# Patient Record
Sex: MALE | Race: WHITE
[De-identification: names, ages, dates, MRNs, and addresses within clinical notes are randomized per-mention and may not be internally consistent; named-entity substitution may affect disease eponyms.]

---

## 2017-06-06 ENCOUNTER — HOSPITAL ENCOUNTER (EMERGENCY)
Dept: HOSPITAL 8 - ED | Age: 72
Discharge: HOME | End: 2017-06-06
Payer: COMMERCIAL

## 2017-06-06 VITALS — BODY MASS INDEX: 20.48 KG/M2 | WEIGHT: 135.14 LBS | HEIGHT: 68 IN

## 2017-06-06 VITALS — DIASTOLIC BLOOD PRESSURE: 58 MMHG | SYSTOLIC BLOOD PRESSURE: 122 MMHG
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
## 2017-06-06 DIAGNOSIS — S39.013A: Primary | ICD-10-CM

## 2017-06-06 DIAGNOSIS — I10: ICD-10-CM
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
## 2017-06-06 DIAGNOSIS — Z87.891: ICD-10-CM
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
## 2017-06-06 DIAGNOSIS — M13.851: ICD-10-CM

## 2017-06-06 PROCEDURE — 76870 US EXAM SCROTUM: CPT

## 2017-06-06 PROCEDURE — 81003 URINALYSIS AUTO W/O SCOPE: CPT

## 2017-06-06 PROCEDURE — 99285 EMERGENCY DEPT VISIT HI MDM: CPT

## 2017-06-06 PROCEDURE — 76857 US EXAM PELVIC LIMITED: CPT

## 2018-07-20 ENCOUNTER — HOSPITAL ENCOUNTER (OUTPATIENT)
Dept: HOSPITAL 8 - RAD | Age: 73
Discharge: HOME | End: 2018-07-20
Attending: OTOLARYNGOLOGY
Payer: COMMERCIAL

## 2018-07-20 DIAGNOSIS — K13.79: ICD-10-CM

## 2018-07-20 DIAGNOSIS — J43.2: ICD-10-CM

## 2018-07-20 DIAGNOSIS — D10.39: ICD-10-CM

## 2018-07-20 DIAGNOSIS — C76.0: Primary | ICD-10-CM

## 2018-07-20 LAB — CREAT SERPL-MCNC: 0.88 MG/DL (ref 0.7–1.3)

## 2018-07-20 PROCEDURE — 36415 COLL VENOUS BLD VENIPUNCTURE: CPT

## 2018-07-20 PROCEDURE — 71250 CT THORAX DX C-: CPT

## 2018-07-20 PROCEDURE — 70487 CT MAXILLOFACIAL W/DYE: CPT

## 2018-07-20 PROCEDURE — 82565 ASSAY OF CREATININE: CPT

## 2018-08-08 ENCOUNTER — HOSPITAL ENCOUNTER (OUTPATIENT)
Dept: HOSPITAL 8 - STAR | Age: 73
Discharge: HOME | End: 2018-08-08
Attending: OTOLARYNGOLOGY
Payer: COMMERCIAL

## 2018-08-08 VITALS — BODY MASS INDEX: 22.02 KG/M2 | WEIGHT: 145.31 LBS | HEIGHT: 68 IN

## 2018-08-08 DIAGNOSIS — D10.30: ICD-10-CM

## 2018-08-08 DIAGNOSIS — Z01.818: Primary | ICD-10-CM

## 2018-08-08 PROCEDURE — 93005 ELECTROCARDIOGRAM TRACING: CPT

## 2021-01-13 DIAGNOSIS — Z23 NEED FOR VACCINATION: ICD-10-CM

## 2022-03-01 ENCOUNTER — OFFICE VISIT (OUTPATIENT)
Dept: MEDICAL GROUP | Facility: CLINIC | Age: 77
End: 2022-03-01
Payer: COMMERCIAL

## 2022-03-01 VITALS
DIASTOLIC BLOOD PRESSURE: 84 MMHG | HEIGHT: 68 IN | HEART RATE: 96 BPM | BODY MASS INDEX: 19.93 KG/M2 | RESPIRATION RATE: 16 BRPM | SYSTOLIC BLOOD PRESSURE: 148 MMHG | WEIGHT: 131.5 LBS | OXYGEN SATURATION: 96 %

## 2022-03-01 DIAGNOSIS — E78.5 DYSLIPIDEMIA: ICD-10-CM

## 2022-03-01 DIAGNOSIS — E78.5 HYPERLIPIDEMIA, UNSPECIFIED HYPERLIPIDEMIA TYPE: ICD-10-CM

## 2022-03-01 DIAGNOSIS — J44.9 CHRONIC OBSTRUCTIVE PULMONARY DISEASE, UNSPECIFIED COPD TYPE (HCC): ICD-10-CM

## 2022-03-01 DIAGNOSIS — I10 PRIMARY HYPERTENSION: ICD-10-CM

## 2022-03-01 PROCEDURE — 99214 OFFICE O/P EST MOD 30 MIN: CPT | Mod: GC | Performed by: STUDENT IN AN ORGANIZED HEALTH CARE EDUCATION/TRAINING PROGRAM

## 2022-03-01 RX ORDER — TIOTROPIUM BROMIDE INHALATION SPRAY 3.12 UG/1
SPRAY, METERED RESPIRATORY (INHALATION)
COMMUNITY
Start: 2022-02-02

## 2022-03-01 RX ORDER — AMLODIPINE BESYLATE 10 MG/1
10 TABLET ORAL DAILY
Qty: 100 TABLET | Refills: 3 | Status: SHIPPED | OUTPATIENT
Start: 2022-03-01 | End: 2022-03-07 | Stop reason: SDUPTHER

## 2022-03-01 RX ORDER — TIOTROPIUM BROMIDE INHALATION SPRAY 3.12 UG/1
2 SPRAY, METERED RESPIRATORY (INHALATION) DAILY
COMMUNITY
Start: 2021-12-20 | End: 2023-03-27

## 2022-03-01 RX ORDER — BUDESONIDE AND FORMOTEROL FUMARATE DIHYDRATE 160; 4.5 UG/1; UG/1
2 AEROSOL RESPIRATORY (INHALATION)
COMMUNITY
Start: 2021-12-20

## 2022-03-01 RX ORDER — BUDESONIDE AND FORMOTEROL FUMARATE DIHYDRATE 160; 4.5 UG/1; UG/1
AEROSOL RESPIRATORY (INHALATION)
COMMUNITY
Start: 2022-02-02 | End: 2022-03-01

## 2022-03-01 RX ORDER — ATORVASTATIN CALCIUM 40 MG/1
40 TABLET, FILM COATED ORAL NIGHTLY
Qty: 100 TABLET | Refills: 3 | Status: SHIPPED | OUTPATIENT
Start: 2022-03-01 | End: 2022-03-07 | Stop reason: SDUPTHER

## 2022-03-01 NOTE — PROGRESS NOTES
"[facility]    Chief Complaint   Patient presents with   • Follow-Up     Med review; Pulmonary appt on 3/15,  96% at 2% O2       HISTORY OF PRESENT ILLNESS: Patient is a 76 y.o. male established patient who presents today to discuss the medical issues below:    Problem   Htn (Hypertension)    Patient with hypertension who is on amlodipine 10 mg p.o. daily.  He denies any side effects.  Denies any exertional chest pain, shortness of breath, swelling in extremities, or other complaints.     Copd (Chronic Obstructive Pulmonary Disease) (Hcc)    Patient reports compliance with his inhalers.  Denies any side effects.  Denies any cough, increased sputum production, or shortness of breath.     Hyperlipidemia    Compliant with atorvastatin.  Denies any side effects.          Patient Active Problem List    Diagnosis Date Noted   • HTN (hypertension) 03/01/2022   • COPD (chronic obstructive pulmonary disease) (HCC) 03/01/2022   • Hyperlipidemia 03/01/2022       Allergies:Patient has no allergy information on record.    Current Outpatient Medications   Medication Sig Dispense Refill   • budesonide-formoterol (SYMBICORT) 160-4.5 MCG/ACT Aerosol Inhale 2 Puffs.     • SPIRIVA RESPIMAT 2.5 MCG/ACT Aero Soln      • atorvastatin (LIPITOR) 40 MG Tab Take 1 Tablet by mouth every evening. 100 Tablet 3   • amLODIPine (NORVASC) 10 MG Tab Take 1 Tablet by mouth every day. 100 Tablet 3   • tiotropium (SPIRIVA RESPIMAT) 2.5 mcg/Act Aero Soln Inhale 2 Puffs every day.       No current facility-administered medications for this visit.         No past medical history on file.         No family status information on file.   No family history on file.    ROS:  Negative except as stated above.      Exam:    /84 (BP Location: Left arm, Patient Position: Sitting, BP Cuff Size: Small adult)   Pulse 96   Resp 16   Ht 1.727 m (5' 8\")   Wt 59.6 kg (131 lb 8 oz)   SpO2 96%  Body mass index is 19.99 kg/m².  General:  Well nourished, " well developed male in NAD.  HENT: Normocephalic, bilateral TMs are intact, nasal and oral mucosa with no lesions,   Neck: Supple without bruit. Thyroid is not enlarged, no nodules palpated.  Pulmonary: Clear to ausculation.  Coarse breath sounds.  Normal effort. No rales, rhonchi, or wheezing.  Cardiovascular: Regular rate and rhythm without murmur.   Abdomen: Normal bowel sounds, soft and nontender, no palpable liver, spleen, or masses.  Extremities: No LE edema noted. 5/5 strength in all extremities  Neuro: Grossly nonfocal.  Skin: No lesions, erythema, or other abnormalities noted.  Psych: Alert and oriented to person, place, and time. Appropriate mood and conversation.        Assessment/Plan:    HTN (hypertension)  Chronic, stable    76-year-old male with multiple comorbidities and end-stage COPD presenting for follow-up and found to be hypertensive 148/84.  Repeat blood pressure 150/90.  Discussed goal in this patient 150/90.  Encourage patient to obtain blood pressure cuff and check readings at home at least 4 times per week.  Follow-up in 1 month with blood pressure readings.    -Continue amlodipine 10 mg p.o. daily  -Check CBC and CMP  -Return to clinic in 1 month with BP log    COPD (chronic obstructive pulmonary disease) (HCC)  Chronic, stable    -Continue Spiriva 2 puffs daily, Symbicort, and as needed albuterol    Hyperlipidemia  Chronic, stable    -Repeat lipid panel  -Continue atorvastatin 40 mg p.o. daily

## 2022-03-01 NOTE — ASSESSMENT & PLAN NOTE
Chronic, stable    76-year-old male with multiple comorbidities and end-stage COPD presenting for follow-up and found to be hypertensive 148/84.  Repeat blood pressure 150/90.  Discussed goal in this patient 150/90.  Encourage patient to obtain blood pressure cuff and check readings at home at least 4 times per week.  Follow-up in 1 month with blood pressure readings.    -Continue amlodipine 10 mg p.o. daily  -Check CBC and CMP  -Return to clinic in 1 month with BP log

## 2022-03-07 RX ORDER — AMLODIPINE BESYLATE 10 MG/1
10 TABLET ORAL DAILY
Qty: 100 TABLET | Refills: 3 | Status: SHIPPED | OUTPATIENT
Start: 2022-03-07 | End: 2023-09-11 | Stop reason: SDUPTHER

## 2022-03-07 RX ORDER — ATORVASTATIN CALCIUM 40 MG/1
40 TABLET, FILM COATED ORAL NIGHTLY
Qty: 100 TABLET | Refills: 3 | Status: SHIPPED | OUTPATIENT
Start: 2022-03-07 | End: 2023-09-11 | Stop reason: SDUPTHER

## 2022-03-22 ENCOUNTER — HOSPITAL ENCOUNTER (OUTPATIENT)
Dept: LAB | Facility: MEDICAL CENTER | Age: 77
End: 2022-03-22
Attending: STUDENT IN AN ORGANIZED HEALTH CARE EDUCATION/TRAINING PROGRAM
Payer: COMMERCIAL

## 2022-03-22 DIAGNOSIS — J44.9 CHRONIC OBSTRUCTIVE PULMONARY DISEASE, UNSPECIFIED COPD TYPE (HCC): ICD-10-CM

## 2022-03-22 DIAGNOSIS — E78.5 DYSLIPIDEMIA: ICD-10-CM

## 2022-03-22 DIAGNOSIS — I10 PRIMARY HYPERTENSION: ICD-10-CM

## 2022-03-22 LAB
ALBUMIN SERPL BCP-MCNC: 4.8 G/DL (ref 3.2–4.9)
ALBUMIN/GLOB SERPL: 2 G/DL
ALP SERPL-CCNC: 58 U/L (ref 30–99)
ALT SERPL-CCNC: 19 U/L (ref 2–50)
ANION GAP SERPL CALC-SCNC: 11 MMOL/L (ref 7–16)
AST SERPL-CCNC: 24 U/L (ref 12–45)
BASOPHILS # BLD AUTO: 0.6 % (ref 0–1.8)
BASOPHILS # BLD: 0.03 K/UL (ref 0–0.12)
BILIRUB SERPL-MCNC: 0.4 MG/DL (ref 0.1–1.5)
BUN SERPL-MCNC: 10 MG/DL (ref 8–22)
CALCIUM SERPL-MCNC: 10 MG/DL (ref 8.5–10.5)
CHLORIDE SERPL-SCNC: 100 MMOL/L (ref 96–112)
CHOLEST SERPL-MCNC: 208 MG/DL (ref 100–199)
CO2 SERPL-SCNC: 28 MMOL/L (ref 20–33)
CREAT SERPL-MCNC: 0.82 MG/DL (ref 0.5–1.4)
EOSINOPHIL # BLD AUTO: 0.19 K/UL (ref 0–0.51)
EOSINOPHIL NFR BLD: 3.5 % (ref 0–6.9)
ERYTHROCYTE [DISTWIDTH] IN BLOOD BY AUTOMATED COUNT: 48.9 FL (ref 35.9–50)
FASTING STATUS PATIENT QL REPORTED: NORMAL
GFR SERPLBLD CREATININE-BSD FMLA CKD-EPI: 91 ML/MIN/1.73 M 2
GLOBULIN SER CALC-MCNC: 2.4 G/DL (ref 1.9–3.5)
GLUCOSE SERPL-MCNC: 89 MG/DL (ref 65–99)
HCT VFR BLD AUTO: 42.1 % (ref 42–52)
HDLC SERPL-MCNC: 82 MG/DL
HGB BLD-MCNC: 13.4 G/DL (ref 14–18)
IMM GRANULOCYTES # BLD AUTO: 0.01 K/UL (ref 0–0.11)
IMM GRANULOCYTES NFR BLD AUTO: 0.2 % (ref 0–0.9)
LDLC SERPL CALC-MCNC: 110 MG/DL
LYMPHOCYTES # BLD AUTO: 1.6 K/UL (ref 1–4.8)
LYMPHOCYTES NFR BLD: 29.8 % (ref 22–41)
MCH RBC QN AUTO: 30.2 PG (ref 27–33)
MCHC RBC AUTO-ENTMCNC: 31.8 G/DL (ref 33.7–35.3)
MCV RBC AUTO: 95 FL (ref 81.4–97.8)
MONOCYTES # BLD AUTO: 0.52 K/UL (ref 0–0.85)
MONOCYTES NFR BLD AUTO: 9.7 % (ref 0–13.4)
NEUTROPHILS # BLD AUTO: 3.02 K/UL (ref 1.82–7.42)
NEUTROPHILS NFR BLD: 56.2 % (ref 44–72)
NRBC # BLD AUTO: 0 K/UL
NRBC BLD-RTO: 0 /100 WBC
PLATELET # BLD AUTO: 295 K/UL (ref 164–446)
PMV BLD AUTO: 10.6 FL (ref 9–12.9)
POTASSIUM SERPL-SCNC: 4.2 MMOL/L (ref 3.6–5.5)
PROT SERPL-MCNC: 7.2 G/DL (ref 6–8.2)
RBC # BLD AUTO: 4.43 M/UL (ref 4.7–6.1)
SODIUM SERPL-SCNC: 139 MMOL/L (ref 135–145)
TRIGL SERPL-MCNC: 80 MG/DL (ref 0–149)
WBC # BLD AUTO: 5.4 K/UL (ref 4.8–10.8)

## 2022-03-22 PROCEDURE — 80053 COMPREHEN METABOLIC PANEL: CPT

## 2022-03-22 PROCEDURE — 80061 LIPID PANEL: CPT

## 2022-03-22 PROCEDURE — 36415 COLL VENOUS BLD VENIPUNCTURE: CPT

## 2022-03-22 PROCEDURE — 85025 COMPLETE CBC W/AUTO DIFF WBC: CPT

## 2022-03-28 ENCOUNTER — OFFICE VISIT (OUTPATIENT)
Dept: MEDICAL GROUP | Facility: CLINIC | Age: 77
End: 2022-03-28
Payer: COMMERCIAL

## 2022-03-28 VITALS
BODY MASS INDEX: 20.16 KG/M2 | WEIGHT: 133 LBS | SYSTOLIC BLOOD PRESSURE: 152 MMHG | OXYGEN SATURATION: 89 % | HEART RATE: 100 BPM | DIASTOLIC BLOOD PRESSURE: 83 MMHG | RESPIRATION RATE: 17 BRPM | HEIGHT: 68 IN

## 2022-03-28 DIAGNOSIS — I10 PRIMARY HYPERTENSION: ICD-10-CM

## 2022-03-28 PROCEDURE — 99213 OFFICE O/P EST LOW 20 MIN: CPT | Mod: GE | Performed by: STUDENT IN AN ORGANIZED HEALTH CARE EDUCATION/TRAINING PROGRAM

## 2022-03-28 ASSESSMENT — PATIENT HEALTH QUESTIONNAIRE - PHQ9: CLINICAL INTERPRETATION OF PHQ2 SCORE: 0

## 2022-03-28 ASSESSMENT — FIBROSIS 4 INDEX: FIB4 SCORE: 1.42

## 2022-04-02 NOTE — PROGRESS NOTES
"[facility]    Chief Complaint   Patient presents with   • Follow-Up       HISTORY OF PRESENT ILLNESS: Patient is a 76 y.o. male established patient who presents today to discuss the medical issues below:    Problem   Htn (Hypertension)    Patient with hypertension who is on amlodipine 10 mg p.o. daily.  He denies any side effects.  Denies any exertional chest pain, shortness of breath, swelling in extremities, or other complaints. Checking BP at home and ranging -s-140's/70's-90s.                Patient Active Problem List    Diagnosis Date Noted   • HTN (hypertension) 03/01/2022   • COPD (chronic obstructive pulmonary disease) (HCC) 03/01/2022   • Hyperlipidemia 03/01/2022       Allergies:Patient has no allergy information on record.    Current Outpatient Medications   Medication Sig Dispense Refill   • amLODIPine (NORVASC) 10 MG Tab Take 1 Tablet by mouth every day. 100 Tablet 3   • atorvastatin (LIPITOR) 40 MG Tab Take 1 Tablet by mouth every evening. 100 Tablet 3   • budesonide-formoterol (SYMBICORT) 160-4.5 MCG/ACT Aerosol Inhale 2 Puffs.     • tiotropium (SPIRIVA RESPIMAT) 2.5 mcg/Act Aero Soln Inhale 2 Puffs every day.     • SPIRIVA RESPIMAT 2.5 MCG/ACT Aero Soln        No current facility-administered medications for this visit.         No past medical history on file.    Social History     Tobacco Use   • Smoking status: Former Smoker   • Smokeless tobacco: Never Used   Substance Use Topics   • Alcohol use: Not Currently   • Drug use: Not Currently       No family status information on file.   No family history on file.    ROS:  Negative except as stated above.      Exam:    /83 (BP Location: Right arm, Patient Position: Sitting, BP Cuff Size: Adult)   Pulse 100   Resp 17   Ht 1.727 m (5' 8\")   Wt 60.3 kg (133 lb)   SpO2 89%  Body mass index is 20.22 kg/m².  General: Well-appearing and in no acute distress  HEENT: MMM, EOMI  Lungs: No respiratory distress or audible " wheezing  Heart: Pulse present.  Abdomen: Nondistended.  Skin: No rashes or lesions visible  EXT: Warm and well-perfused  Neuro: Nonfocal    Assessment/Plan:    HTN (hypertension)  Chronic, stable    - Continue amlodipine 10 mg po daily  - RTC in 1 year

## 2023-03-27 ENCOUNTER — OFFICE VISIT (OUTPATIENT)
Dept: MEDICAL GROUP | Facility: CLINIC | Age: 78
End: 2023-03-27
Payer: COMMERCIAL

## 2023-03-27 VITALS
HEART RATE: 106 BPM | WEIGHT: 131 LBS | TEMPERATURE: 98 F | HEIGHT: 68 IN | BODY MASS INDEX: 19.85 KG/M2 | SYSTOLIC BLOOD PRESSURE: 118 MMHG | DIASTOLIC BLOOD PRESSURE: 70 MMHG | OXYGEN SATURATION: 91 %

## 2023-03-27 DIAGNOSIS — J44.9 CHRONIC OBSTRUCTIVE PULMONARY DISEASE, UNSPECIFIED COPD TYPE (HCC): ICD-10-CM

## 2023-03-27 DIAGNOSIS — E78.5 HYPERLIPIDEMIA, UNSPECIFIED HYPERLIPIDEMIA TYPE: ICD-10-CM

## 2023-03-27 DIAGNOSIS — I10 PRIMARY HYPERTENSION: ICD-10-CM

## 2023-03-27 PROCEDURE — 99213 OFFICE O/P EST LOW 20 MIN: CPT | Mod: GE | Performed by: HEALTH CARE PROVIDER

## 2023-03-27 ASSESSMENT — PATIENT HEALTH QUESTIONNAIRE - PHQ9: CLINICAL INTERPRETATION OF PHQ2 SCORE: 0

## 2023-03-27 ASSESSMENT — FIBROSIS 4 INDEX: FIB4 SCORE: 1.44

## 2023-03-27 NOTE — ASSESSMENT & PLAN NOTE
Stable, continuing to follow with Pulmonary Clinic. Continue with Spiriva and Symbicort with PRN albuterol. Stable on 2L supplemental oxygen. No changes needed at this time.

## 2023-03-27 NOTE — ASSESSMENT & PLAN NOTE
Stable, well controlled. /70 on recheck today with no symptoms of hypertensive emergency or hypotension. Will continue amlodipine 10 mg daily at this time.

## 2023-03-27 NOTE — PROGRESS NOTES
"  [facility]    Subjective:     CC: Follow up    HPI:   Brent HERNÁNDEZ is a 77 y.o. male with past medical history of COPD, HTN, HLD presenting today for follow up. No acute complaints. Follows with pulmonary clinic and has been seen in past 1-2 weeks. Stable on Spiriva and Symbicort. Remains on 2L supplemental oxygen via [place] for 10+ years. HTN has been stable for several years on amlodipine 10 mg daily.      Current Outpatient Medications Ordered in Epic   Medication Sig Dispense Refill    amLODIPine (NORVASC) 10 MG Tab Take 1 Tablet by mouth every day. 100 Tablet 3    atorvastatin (LIPITOR) 40 MG Tab Take 1 Tablet by mouth every evening. 100 Tablet 3    SPIRIVA RESPIMAT 2.5 MCG/ACT Aero Soln       budesonide-formoterol (SYMBICORT) 160-4.5 MCG/ACT Aerosol Inhale 2 Puffs. (Patient not taking: Reported on 3/27/2023)       No current [facility]-ordered facility-administered medications on file.         ROS:  Gen: no fevers/chills, no changes in weight  Eyes: no changes in vision  ENT: no sore throat, no hearing loss, no bloody nose  Pulm: no sob, no cough  CV: no chest pain, no palpitations  GI: no nausea/vomiting, no diarrhea  : no dysuria  MSk: no myalgias  Skin: no rash  Neuro: no headaches, no numbness/tingling  Heme/Lymph: no easy bruising      Objective:     Exam:  /70   Pulse (!) 106   Temp 36.7 °C (98 °F)   Ht 1.727 m (5' 8\")   Wt 59.4 kg (131 lb)   SpO2 91% Comment: 2 litters  BMI 19.92 kg/m²  Body mass index is 19.92 kg/m².    Gen:  Alert and oriented, No apparent distress, nasal cannula in place.  HEENT: Neck is supple without lymphadenopathy, EOMI, no pharyngeal erythema or exudate  Lungs:  Normal effort, CTA bilaterally, no wheezes, rhonchi, or rales  CV:  Regular rate and rhythm. No murmurs, rubs, or gallops.  Abd:      Soft, non-tender, non-distended  Ext:  No clubbing, cyanosis, edema.      Labs: None      Assessment & Plan:     77 y.o. male with the following -     Problem List Items " Addressed This Visit       HTN (hypertension)     Stable, well controlled. /70 on recheck today with no symptoms of hypertensive emergency or hypotension. Will continue amlodipine 10 mg daily at this time.         COPD (chronic obstructive pulmonary disease) (HCC)     Stable, continuing to follow with Pulmonary Clinic. Continue with Spiriva and Symbicort with PRN albuterol. Stable on 2L supplemental oxygen. No changes needed at this time.         Relevant Orders    Comp Metabolic Panel    CBC WITH DIFFERENTIAL    Lipid Profile    Hyperlipidemia     Stable on Lipitor 40 mg daily. Will recheck lipid panel at this time.              Follow up in 6 months or earlier as needed          Tobi Nevarez M.D.  [facility]  PGY-2

## 2023-07-24 ENCOUNTER — HOSPITAL ENCOUNTER (OUTPATIENT)
Dept: LAB | Facility: MEDICAL CENTER | Age: 78
End: 2023-07-24
Attending: HEALTH CARE PROVIDER
Payer: COMMERCIAL

## 2023-07-24 DIAGNOSIS — J44.9 CHRONIC OBSTRUCTIVE PULMONARY DISEASE, UNSPECIFIED COPD TYPE (HCC): ICD-10-CM

## 2023-07-24 LAB
ALBUMIN SERPL BCP-MCNC: 4.4 G/DL (ref 3.2–4.9)
ALBUMIN/GLOB SERPL: 1.5 G/DL
ALP SERPL-CCNC: 71 U/L (ref 30–99)
ALT SERPL-CCNC: 26 U/L (ref 2–50)
ANION GAP SERPL CALC-SCNC: 11 MMOL/L (ref 7–16)
AST SERPL-CCNC: 22 U/L (ref 12–45)
BASOPHILS # BLD AUTO: 0.7 % (ref 0–1.8)
BASOPHILS # BLD: 0.04 K/UL (ref 0–0.12)
BILIRUB SERPL-MCNC: 0.4 MG/DL (ref 0.1–1.5)
BUN SERPL-MCNC: 11 MG/DL (ref 8–22)
CALCIUM ALBUM COR SERPL-MCNC: 10 MG/DL (ref 8.5–10.5)
CALCIUM SERPL-MCNC: 10.3 MG/DL (ref 8.5–10.5)
CHLORIDE SERPL-SCNC: 99 MMOL/L (ref 96–112)
CHOLEST SERPL-MCNC: 212 MG/DL (ref 100–199)
CO2 SERPL-SCNC: 28 MMOL/L (ref 20–33)
CREAT SERPL-MCNC: 0.72 MG/DL (ref 0.5–1.4)
EOSINOPHIL # BLD AUTO: 0.14 K/UL (ref 0–0.51)
EOSINOPHIL NFR BLD: 2.5 % (ref 0–6.9)
ERYTHROCYTE [DISTWIDTH] IN BLOOD BY AUTOMATED COUNT: 50.4 FL (ref 35.9–50)
FASTING STATUS PATIENT QL REPORTED: NORMAL
GFR SERPLBLD CREATININE-BSD FMLA CKD-EPI: 94 ML/MIN/1.73 M 2
GLOBULIN SER CALC-MCNC: 2.9 G/DL (ref 1.9–3.5)
GLUCOSE SERPL-MCNC: 88 MG/DL (ref 65–99)
HCT VFR BLD AUTO: 43.7 % (ref 42–52)
HDLC SERPL-MCNC: 68 MG/DL
HGB BLD-MCNC: 14 G/DL (ref 14–18)
IMM GRANULOCYTES # BLD AUTO: 0.01 K/UL (ref 0–0.11)
IMM GRANULOCYTES NFR BLD AUTO: 0.2 % (ref 0–0.9)
LDLC SERPL CALC-MCNC: 127 MG/DL
LYMPHOCYTES # BLD AUTO: 1.33 K/UL (ref 1–4.8)
LYMPHOCYTES NFR BLD: 24 % (ref 22–41)
MCH RBC QN AUTO: 30.6 PG (ref 27–33)
MCHC RBC AUTO-ENTMCNC: 32 G/DL (ref 32.3–36.5)
MCV RBC AUTO: 95.4 FL (ref 81.4–97.8)
MONOCYTES # BLD AUTO: 0.5 K/UL (ref 0–0.85)
MONOCYTES NFR BLD AUTO: 9 % (ref 0–13.4)
NEUTROPHILS # BLD AUTO: 3.53 K/UL (ref 1.82–7.42)
NEUTROPHILS NFR BLD: 63.6 % (ref 44–72)
NRBC # BLD AUTO: 0 K/UL
NRBC BLD-RTO: 0 /100 WBC (ref 0–0.2)
PLATELET # BLD AUTO: 278 K/UL (ref 164–446)
PMV BLD AUTO: 10.3 FL (ref 9–12.9)
POTASSIUM SERPL-SCNC: 3.9 MMOL/L (ref 3.6–5.5)
PROT SERPL-MCNC: 7.3 G/DL (ref 6–8.2)
RBC # BLD AUTO: 4.58 M/UL (ref 4.7–6.1)
SODIUM SERPL-SCNC: 138 MMOL/L (ref 135–145)
TRIGL SERPL-MCNC: 87 MG/DL (ref 0–149)
WBC # BLD AUTO: 5.6 K/UL (ref 4.8–10.8)

## 2023-07-24 PROCEDURE — 85025 COMPLETE CBC W/AUTO DIFF WBC: CPT

## 2023-07-24 PROCEDURE — 80061 LIPID PANEL: CPT

## 2023-07-24 PROCEDURE — 80053 COMPREHEN METABOLIC PANEL: CPT

## 2023-07-24 PROCEDURE — 36415 COLL VENOUS BLD VENIPUNCTURE: CPT

## 2023-09-11 ENCOUNTER — OFFICE VISIT (OUTPATIENT)
Dept: MEDICAL GROUP | Facility: CLINIC | Age: 78
End: 2023-09-11
Payer: COMMERCIAL

## 2023-09-11 VITALS
TEMPERATURE: 98.3 F | WEIGHT: 133.25 LBS | HEART RATE: 93 BPM | BODY MASS INDEX: 20.19 KG/M2 | HEIGHT: 68 IN | OXYGEN SATURATION: 92 % | DIASTOLIC BLOOD PRESSURE: 80 MMHG | SYSTOLIC BLOOD PRESSURE: 145 MMHG

## 2023-09-11 DIAGNOSIS — J96.11 CHRONIC HYPOXEMIC RESPIRATORY FAILURE (HCC): ICD-10-CM

## 2023-09-11 DIAGNOSIS — E78.5 HYPERLIPIDEMIA, UNSPECIFIED HYPERLIPIDEMIA TYPE: ICD-10-CM

## 2023-09-11 DIAGNOSIS — I10 PRIMARY HYPERTENSION: ICD-10-CM

## 2023-09-11 DIAGNOSIS — J44.9 CHRONIC OBSTRUCTIVE PULMONARY DISEASE, UNSPECIFIED COPD TYPE (HCC): ICD-10-CM

## 2023-09-11 PROCEDURE — 3079F DIAST BP 80-89 MM HG: CPT

## 2023-09-11 PROCEDURE — 3077F SYST BP >= 140 MM HG: CPT

## 2023-09-11 PROCEDURE — 99214 OFFICE O/P EST MOD 30 MIN: CPT | Mod: GC

## 2023-09-11 RX ORDER — AMLODIPINE BESYLATE 10 MG/1
10 TABLET ORAL DAILY
Qty: 100 TABLET | Refills: 3 | Status: SHIPPED | OUTPATIENT
Start: 2023-09-11

## 2023-09-11 RX ORDER — ATORVASTATIN CALCIUM 40 MG/1
40 TABLET, FILM COATED ORAL NIGHTLY
Qty: 100 TABLET | Refills: 3 | Status: SHIPPED | OUTPATIENT
Start: 2023-09-11

## 2023-09-11 ASSESSMENT — FIBROSIS 4 INDEX: FIB4 SCORE: 1.2

## 2023-09-11 NOTE — ASSESSMENT & PLAN NOTE
Afebrile, /80.  Patient reports his blood pressure is always elevated when he comes into the doctor's office.  Home BPs are rangins-140s/70s-90s.  Compliant on amlodipine 10 mg.  Denies any SOB, chest pain or edema.  PE: Cardio regular rate and rhythm no murmur appreciated, pulmonary clear to auscultation bilaterally no wheezing appreciated.  BP within normal range for patient's age, will continue with current anti-hypertensive medication.    Plan  - Refill amlodipine 10 mg to be taken daily for 1 year.  - Patient to continue taking home blood pressure readings to monitor blood pressure  - Encourage patient to maintain healthy diet and visible activity.  - Follow-up in 6 months

## 2023-09-11 NOTE — PROGRESS NOTES
CC:Diagnoses of Primary hypertension, Hyperlipidemia, unspecified hyperlipidemia type, Chronic obstructive pulmonary disease, unspecified COPD type (HCC), and Chronic hypoxemic respiratory failure ([facility]) were pertinent to this visit.    HISTORY OF PRESENT ILLNESS: Patient is a 77 y.o. male established patient who presents today     Problem   Chronic Hypoxemic Respiratory Failure (Hcc)    See COPD.     Htn (Hypertension)    Patient with hypertension who is compliant on amlodipine 10 mg daily.  He denies any side effects.  Denies any shortness of breath, chest pain or edema.  No other complaints or concerns. Home BP rangin-s-140's/70's-90s.           Copd (Chronic Obstructive Pulmonary Disease) ([facility])    Patient reports compliance with his inhalers.  Is currently on 2 L of oxygen during the day and 3 L of oxygen at night. Denies any cough, increased sputum production, or shortness of breath.  Patient has no concerns.  Has a follow-up appointment with pulmonology on , who is managing his Symbicort and his Spiriva inhalers.     Hyperlipidemia    Compliant with atorvastatin.  Denies any side effects.          Patient Active Problem List    Diagnosis Date Noted    Chronic hypoxemic respiratory failure (HCC) 2023    HTN (hypertension) 2022    COPD (chronic obstructive pulmonary disease) ([facility]) 2022    Hyperlipidemia 2022        Allergies:Patient has no allergy information on record.    Current Outpatient Medications   Medication Sig Dispense Refill    atorvastatin (LIPITOR) 40 MG Tab Take 1 Tablet by mouth every evening. 100 Tablet 3    amLODIPine (NORVASC) 10 MG Tab Take 1 Tablet by mouth every day. 100 Tablet 3    budesonide-formoterol (SYMBICORT) 160-4.5 MCG/ACT Aerosol Inhale 2 Puffs.      SPIRIVA RESPIMAT 2.5 MCG/ACT Aero Soln        No current facility-administered medications for this visit.       Social History     Tobacco Use    Smoking status: Former    Smokeless tobacco: Never  "  Substance Use Topics    Alcohol use: Not Currently    Drug use: Not Currently     Social History     Social History Narrative    Not on file       History reviewed. No pertinent family history.    Exam:    BP (!) 145/80 (BP Location: Left arm, Patient Position: Sitting)   Pulse 93   Temp 36.8 °C (98.3 °F) (Temporal)   Ht 1.727 m (5' 8\")   Wt 60.4 kg (133 lb 4 oz)   SpO2 92%  Body mass index is 20.26 kg/m².    General:  Well nourished, well developed male in NAD  HENT: Atraumatic, normocephalic  EYES: Extraocular movements intact, pupils equal and reactive to light  NECK: Supple, FROM  CHEST: No deformities, Equal chest expansion.  Regular rate and rhythm no murmur appreciated.  RESP: Unlabored, no stridor or audible wheeze.  Clear to auscultation bilaterally no wheezing appreciated.  ABD: Non-Distended  Extremities: No Clubbing, Cyanosis, or Edema  Skin: Warm/dry, without rashes  Neuro: A/O x 4, CN 2-12 Grossly intact, Motor/sensory grossly intact  Psych: Normal behavior, normal affect    LABS: Results reviewed and discussed with the patient, questions answered.   Latest Reference Range & Units 07/24/23 10:14   WBC 4.8 - 10.8 K/uL 5.6   RBC 4.70 - 6.10 M/uL 4.58 (L)   Hemoglobin 14.0 - 18.0 g/dL 14.0   Hematocrit 42.0 - 52.0 % 43.7   MCV 81.4 - 97.8 fL 95.4   MCH 27.0 - 33.0 pg 30.6   MCHC 32.3 - 36.5 g/dL 32.0 (L)   RDW 35.9 - 50.0 fL 50.4 (H)   Platelet Count 164 - 446 K/uL 278   MPV 9.0 - 12.9 fL 10.3   Neutrophils-Polys 44.00 - 72.00 % 63.60   Neutrophils (Absolute) 1.82 - 7.42 K/uL 3.53   Lymphocytes 22.00 - 41.00 % 24.00   Lymphs (Absolute) 1.00 - 4.80 K/uL 1.33   Monocytes 0.00 - 13.40 % 9.00   Monos (Absolute) 0.00 - 0.85 K/uL 0.50   Eosinophils 0.00 - 6.90 % 2.50   Eos (Absolute) 0.00 - 0.51 K/uL 0.14   Basophils 0.00 - 1.80 % 0.70   Baso (Absolute) 0.00 - 0.12 K/uL 0.04   Immature Granulocytes 0.00 - 0.90 % 0.20   Immature Granulocytes (abs) 0.00 - 0.11 K/uL 0.01   Nucleated RBC 0.00 - 0.20 /100 WBC " 0.00   NRBC (Absolute) K/uL 0.00   Sodium 135 - 145 mmol/L 138   Potassium 3.6 - 5.5 mmol/L 3.9   Chloride 96 - 112 mmol/L 99   Co2 20 - 33 mmol/L 28   Anion Gap 7.0 - 16.0  11.0   Glucose 65 - 99 mg/dL 88   Bun 8 - 22 mg/dL 11   Creatinine 0.50 - 1.40 mg/dL 0.72   GFR (CKD-EPI) >60 mL/min/1.73 m 2 94   Calcium 8.5 - 10.5 mg/dL 10.3   Correct Calcium 8.5 - 10.5 mg/dL 10.0   AST(SGOT) 12 - 45 U/L 22   ALT(SGPT) 2 - 50 U/L 26   Alkaline Phosphatase 30 - 99 U/L 71   Total Bilirubin 0.1 - 1.5 mg/dL 0.4   Albumin 3.2 - 4.9 g/dL 4.4   Total Protein 6.0 - 8.2 g/dL 7.3   Globulin 1.9 - 3.5 g/dL 2.9   A-G Ratio g/dL 1.5   Fasting Status  Fasting   (L): Data is abnormally low  (H): Data is abnormally high    Assessment/Plan    Hyperlipidemia  Lipid panel on     Latest Reference Range & Units 23 10:14   Cholesterol,Tot 100 - 199 mg/dL 212 (H)   Triglycerides 0 - 149 mg/dL 87   HDL >=40 mg/dL 68   LDL <100 mg/dL 127 (H)   (H): Data is abnormally high    Plan  - Continue atorvastatin 40 mg taken nightly.    HTN (hypertension)  Afebrile, /80.  Patient reports his blood pressure is always elevated when he comes into the doctor's office.  Home BPs are rangins-140s/70s-90s.  Compliant on amlodipine 10 mg.  Denies any SOB, chest pain or edema.  PE: Cardio regular rate and rhythm no murmur appreciated, pulmonary clear to auscultation bilaterally no wheezing appreciated.  BP within normal range for patient's age, will continue with current anti-hypertensive medication.    Plan  - Refill amlodipine 10 mg to be taken daily for 1 year.  - Patient to continue taking home blood pressure readings to monitor blood pressure  - Encourage patient to maintain healthy diet and visible activity.  - Follow-up in 6 months    COPD (chronic obstructive pulmonary disease) (HCC)  Afebrile, SPO2 92% on 2 L oxygen.  Patient is followed by pulmonology who is managing COPD inhalers: Symbicort and Spiriva and is scheduled for an  appointment on 9/25.  Patient's oxygen uses 2 L during the day and 3 L at night.  Denies any cough, increased sputum production or shortness of breath.  PE: Respiratory no cough, increased sputum production and clear to auscultation bilaterally no wheezing appreciated.    Plan  - Continue with pulmonology, appointment on 9/25 for management of COPD inhalers.  - Continue using supplemental oxygen 2 L during the day and 3 L at night.  - Follow-up 6 months    Chronic hypoxemic respiratory failure (HCC)  See COPD.      No follow-ups on file.    Brittaney Bryant MD PGY2

## 2023-09-11 NOTE — ASSESSMENT & PLAN NOTE
Lipid panel on 7/24    Latest Reference Range & Units 07/24/23 10:14   Cholesterol,Tot 100 - 199 mg/dL 212 (H)   Triglycerides 0 - 149 mg/dL 87   HDL >=40 mg/dL 68   LDL <100 mg/dL 127 (H)   (H): Data is abnormally high    Plan  - Continue atorvastatin 40 mg taken nightly.

## 2023-09-11 NOTE — ASSESSMENT & PLAN NOTE
Afebrile, SPO2 92% on 2 L oxygen.  Patient is followed by pulmonology who is managing COPD inhalers: Symbicort and Spiriva and is scheduled for an appointment on 9/25.  Patient's oxygen uses 2 L during the day and 3 L at night.  Denies any cough, increased sputum production or shortness of breath.  PE: Respiratory no cough, increased sputum production and clear to auscultation bilaterally no wheezing appreciated.    Plan  - Continue with pulmonology, appointment on 9/25 for management of COPD inhalers.  - Continue using supplemental oxygen 2 L during the day and 3 L at night.  - Follow-up 6 months